# Patient Record
Sex: FEMALE | Race: ASIAN | Employment: FULL TIME
[De-identification: names, ages, dates, MRNs, and addresses within clinical notes are randomized per-mention and may not be internally consistent; named-entity substitution may affect disease eponyms.]

---

## 2023-10-09 ENCOUNTER — OFFICE VISIT (OUTPATIENT)
Dept: PRIMARY CARE CLINIC | Facility: CLINIC | Age: 43
End: 2023-10-09
Payer: COMMERCIAL

## 2023-10-09 VITALS
HEART RATE: 78 BPM | TEMPERATURE: 98.4 F | SYSTOLIC BLOOD PRESSURE: 132 MMHG | RESPIRATION RATE: 16 BRPM | HEIGHT: 64 IN | WEIGHT: 128 LBS | OXYGEN SATURATION: 99 % | DIASTOLIC BLOOD PRESSURE: 78 MMHG | BODY MASS INDEX: 21.85 KG/M2

## 2023-10-09 DIAGNOSIS — Z76.89 ENCOUNTER TO ESTABLISH CARE WITH NEW DOCTOR: ICD-10-CM

## 2023-10-09 DIAGNOSIS — Z00.00 ANNUAL PHYSICAL EXAM: Primary | ICD-10-CM

## 2023-10-09 DIAGNOSIS — Z12.31 BREAST CANCER SCREENING BY MAMMOGRAM: ICD-10-CM

## 2023-10-09 DIAGNOSIS — Z80.1 FAMILY HISTORY OF LUNG CANCER: ICD-10-CM

## 2023-10-09 DIAGNOSIS — R91.8 MULTIPLE PULMONARY NODULES: ICD-10-CM

## 2023-10-09 PROCEDURE — 99386 PREV VISIT NEW AGE 40-64: CPT | Performed by: FAMILY MEDICINE

## 2023-10-09 ASSESSMENT — ENCOUNTER SYMPTOMS
SHORTNESS OF BREATH: 0
ABDOMINAL PAIN: 0
COUGH: 0
VOMITING: 0
NAUSEA: 0
DIARRHEA: 0

## 2023-10-09 ASSESSMENT — PATIENT HEALTH QUESTIONNAIRE - PHQ9
SUM OF ALL RESPONSES TO PHQ QUESTIONS 1-9: 0
SUM OF ALL RESPONSES TO PHQ QUESTIONS 1-9: 0
1. LITTLE INTEREST OR PLEASURE IN DOING THINGS: 0
SUM OF ALL RESPONSES TO PHQ QUESTIONS 1-9: 0
2. FEELING DOWN, DEPRESSED OR HOPELESS: 0
SUM OF ALL RESPONSES TO PHQ9 QUESTIONS 1 & 2: 0
SUM OF ALL RESPONSES TO PHQ QUESTIONS 1-9: 0

## 2023-10-09 NOTE — PATIENT INSTRUCTIONS
IT WAS GREAT TO SEE YOU TODAY! I HAVE REFERRED YOU TO PULMONOLOGY FOR YOUR LUNG NODULES, THEY WILL CALL YOU ABOUT AN APPOINTMENT. I HAVE ORDERED A MAMMOGRAM FOR YOU, THEY WILL CALL YOU ABOUT AN APPOINTMENT.     I WILL SEE YOU AGAIN IN 1 YEAR BUT PLEASE CALL WITH CONCERNS [phone]

## 2023-10-09 NOTE — ASSESSMENT & PLAN NOTE
Patient found to have pulmonary nodules in [facility] in June 2023, when she had a cough but it has resolved. Notes family history of lung cancer. Told to have it checked once a year. Plan to refer to Pulmonology for monitoring of this.

## 2023-10-09 NOTE — PROGRESS NOTES
DO Ruthann Black, [address] [facility], [address] [facility]  [phone]          ASSESSMENT AND PLAN    Problem List Items Addressed This Visit          Respiratory    Multiple pulmonary nodules      Patient found to have pulmonary nodules in [facility] in June 2023, when she had a cough but it has resolved. Notes family history of lung cancer. Told to have it checked once a year. Plan to refer to Pulmonology for monitoring of this. Relevant Orders    [facility]       Other    Encounter to establish care with new doctor    Annual physical exam - Primary     Other Visit Diagnoses       Breast cancer screening by mammogram        Relevant Orders    DONALDO DIGITAL SCREEN W OR WO CAD BILATERAL    Family history of lung cancer        Relevant Orders    [facility]             The diagnoses and plan were discussed with the patient, who verbalizes understanding and agrees with plan. All questions answered. Chief Complaint    Chief Complaint   Patient presents with    New Patient     Ct  Scan in [facility]  pulmonary nodules. HISTORY OF PRESENT ILLNESS    36 y.o. female presents today to establish care with a new primary care provider. States that she was found to have some lung nodules in [facility] via CT scan in June 2023. States that this was the first time they were found. States that she had a cough when she returned from [facility] in June 2023. States that it improved after a month. States that she does not have congestion or wheezing. Denies SOB or chest tightness. Currently feels fine. Notes that her brother had lung cancer. States that she has an appointment coming up with Ob/Gyn to update her pap smear, due for a mammogram.  Does not get flu shots. PAST MEDICAL HISTORY    No past medical history on file. PAST SURGICAL HISTORY    No past surgical history on file.     FAMILY

## 2023-11-08 PROBLEM — Z00.00 ANNUAL PHYSICAL EXAM: Status: RESOLVED | Noted: 2023-10-09 | Resolved: 2023-11-08
